# Patient Record
Sex: FEMALE | NOT HISPANIC OR LATINO
[De-identification: names, ages, dates, MRNs, and addresses within clinical notes are randomized per-mention and may not be internally consistent; named-entity substitution may affect disease eponyms.]

---

## 2020-01-23 PROBLEM — Z00.00 ENCOUNTER FOR PREVENTIVE HEALTH EXAMINATION: Status: ACTIVE | Noted: 2020-01-23

## 2020-01-29 ENCOUNTER — APPOINTMENT (OUTPATIENT)
Dept: ORTHOPEDIC SURGERY | Facility: CLINIC | Age: 25
End: 2020-01-29
Payer: COMMERCIAL

## 2020-01-29 VITALS — WEIGHT: 95 LBS | HEIGHT: 61 IN | BODY MASS INDEX: 17.94 KG/M2

## 2020-01-29 DIAGNOSIS — Z78.9 OTHER SPECIFIED HEALTH STATUS: ICD-10-CM

## 2020-01-29 PROCEDURE — 99203 OFFICE O/P NEW LOW 30 MIN: CPT

## 2020-01-29 PROCEDURE — 73564 X-RAY EXAM KNEE 4 OR MORE: CPT | Mod: RT

## 2020-01-29 NOTE — DISCUSSION/SUMMARY
[de-identified] : \par Bilateral knee patellofemoral pain. Given home exercise program prescription for physical therapy. She is requesting MRIs at this time we will attempt authorization she may followup as needed

## 2020-01-29 NOTE — HISTORY OF PRESENT ILLNESS
[de-identified] : 23yo female presents complaining of bilateral anterior knee pain for 3 months. This started when she was vacationing in through using a lot of stairs. She has pain anterior aspect of both knees worse with kneeling, bending. She saw orthopedist and her PCP, was given a Medrol Dosepak as well as anti-inflammatory medication with minimal relief. She states she has been going to physical therapy for 2 months with no relief. She was sent for x-rays at [facility] with no images with her today. Denies numbness tingling, injury\par \par The patient's past medical history, past surgical history, medications, allergies, and social history were reviewed by me today with the patient and documented accordingly. In addition, the patient's family history, which is noncontributory to this visit, was also reviewed.\par

## 2020-01-29 NOTE — PHYSICAL EXAM
[de-identified] : General Exam\par \par Well developed, well nourished\par No apparent distress\par Oriented to person, place, and time\par Mood: Normal\par Affect: Normal\par Balance and coordination: Normal\par Gait: Normal\par \par left knee exam\par \par Skin: Clean, dry, intact\par Inspection: No obvious malalignment, no masses, no swelling, no effusion.\par Tenderness: + MJLT. + LJLT. + tenderness over the medial and lateral patella facets. No ttp medial/lateral epicondyle, patella tendon, tibial tubercle, pes anserinus, or proximal fibula.\par ROM: 0 to 130° no pain with deep flexion in both knees\par Stability: Stable to varus, valgus, lachman testing. Negative anterior/posterior drawer.\par Additional tests: Negative McMurrays test, Negative patellar grind test. \par Strength: 5/5 Q/H/TA/GS/EHL, no atrophy\par Neuro: In tact to light touch throughout in dp/sp/tib/talia/saph nerve districutions, DTR's normal\par Pulses: 2+ DP/PT pulses.\par \par right knee exam\par \par Skin: Clean, dry, intact\par Inspection: No obvious malalignment, no masses, no swelling, no effusion.\par Tenderness: +MJLT. +LJLT. +tenderness over the medial and lateral patella facets. No ttp medial/lateral epicondyle, patella tendon, tibial tubercle, pes anserinus, or proximal fibula.\par ROM: 0 to 130° no pain with deep flexion in both knees\par Stability: Stable to varus, valgus, lachman testing. Negative anterior/posterior drawer.\par Additional tests: Negative McMurrays test, Negative patellar grind test. \par Strength: 5/5 Q/H/TA/GS/EHL, no atrophy\par Neuro: In tact to light touch throughout in dp/sp/tib/talia/saph nerve districutions, DTR's normal\par Pulses: 2+ DP/PT pulses.\par  [de-identified] : \par The following radiographs were ordered and read by me during this patients visit. I reviewed each radiograph in detail with the patient and discussed the findings as highlighted below. \par \par 4 views both knees] were obtained today that show no fracture, dislocation. There is no degenerative change seen. There is no malalignment. No obvious osseous abnormality. Otherwise unremarkable.\par

## 2020-02-03 ENCOUNTER — FORM ENCOUNTER (OUTPATIENT)
Age: 25
End: 2020-02-03

## 2020-02-04 ENCOUNTER — APPOINTMENT (OUTPATIENT)
Dept: MRI IMAGING | Facility: CLINIC | Age: 25
End: 2020-02-04
Payer: COMMERCIAL

## 2020-02-04 ENCOUNTER — OUTPATIENT (OUTPATIENT)
Dept: OUTPATIENT SERVICES | Facility: HOSPITAL | Age: 25
LOS: 1 days | End: 2020-02-04
Payer: COMMERCIAL

## 2020-02-04 DIAGNOSIS — Z00.8 ENCOUNTER FOR OTHER GENERAL EXAMINATION: ICD-10-CM

## 2020-02-04 PROCEDURE — 73721 MRI JNT OF LWR EXTRE W/O DYE: CPT

## 2020-02-04 PROCEDURE — 73721 MRI JNT OF LWR EXTRE W/O DYE: CPT | Mod: 26,LT

## 2020-02-05 ENCOUNTER — NON-APPOINTMENT (OUTPATIENT)
Age: 25
End: 2020-02-05

## 2020-03-24 ENCOUNTER — APPOINTMENT (OUTPATIENT)
Dept: CARDIOLOGY | Facility: CLINIC | Age: 25
End: 2020-03-24
Payer: COMMERCIAL

## 2020-03-24 ENCOUNTER — NON-APPOINTMENT (OUTPATIENT)
Age: 25
End: 2020-03-24

## 2020-03-24 VITALS
WEIGHT: 94 LBS | DIASTOLIC BLOOD PRESSURE: 72 MMHG | HEIGHT: 61 IN | OXYGEN SATURATION: 99 % | SYSTOLIC BLOOD PRESSURE: 103 MMHG | BODY MASS INDEX: 17.75 KG/M2 | HEART RATE: 92 BPM

## 2020-03-24 DIAGNOSIS — R55 SYNCOPE AND COLLAPSE: ICD-10-CM

## 2020-03-24 DIAGNOSIS — I95.89 OTHER HYPOTENSION: ICD-10-CM

## 2020-03-24 DIAGNOSIS — R09.89 OTHER SPECIFIED SYMPTOMS AND SIGNS INVOLVING THE CIRCULATORY AND RESPIRATORY SYSTEMS: ICD-10-CM

## 2020-03-24 DIAGNOSIS — R07.9 CHEST PAIN, UNSPECIFIED: ICD-10-CM

## 2020-03-24 DIAGNOSIS — E86.1 OTHER HYPOTENSION: ICD-10-CM

## 2020-03-24 PROCEDURE — 99204 OFFICE O/P NEW MOD 45 MIN: CPT

## 2020-03-24 PROCEDURE — 93268 ECG RECORD/REVIEW: CPT

## 2020-03-24 PROCEDURE — 93000 ELECTROCARDIOGRAM COMPLETE: CPT

## 2020-03-24 RX ORDER — MV-MIN/FOLIC/VIT K/LYCOP/COQ10 200-100MCG
CAPSULE ORAL
Refills: 0 | Status: ACTIVE | COMMUNITY

## 2020-03-24 RX ORDER — ADHESIVE TAPE 3"X 2.3 YD
50 MCG TAPE, NON-MEDICATED TOPICAL
Refills: 0 | Status: ACTIVE | COMMUNITY

## 2020-03-24 RX ORDER — DESOGESTREL AND ETHINYL ESTRADIOL 0.15-0.03
0.15-3 KIT ORAL
Refills: 0 | Status: ACTIVE | COMMUNITY

## 2020-03-26 NOTE — DISCUSSION/SUMMARY
[FreeTextEntry1] : This is a 25-year-old female without any risk factors who has a normal exam and ECG. She runs a very low normal blood pressure and is at risk for orthostatic hypotension. Today she did not exhibit any orthostatic change.\par \par Her episodes in the morning of lightheadedness are probably due to orthostatic hypotension and her episodes of syncope most likely are due to hypotension either from orthostatic change or due to vagal event. She's had 2 episodes that sound vagal. The episode in the shower and the episode that occurred after urination.\par \par We discussed the importance of lifestyle including keeping well hydrated, and adding extra salt to her diet, and getting up slowly. If she does get any episodes where she feels she might pass out she needs to get her head down to prevent syncope..\par \par We will do an event monitor to see if she's having any episodes of arrhythmia or tachycardia to rule out a POTS syndrome. She also will eventually do a 24-hour blood pressure monitor when she is well hydrated and taking extra salt. She will schedule an echocardiogram make sure the heart is structurally normal.\par \par The episodes of chest pain occurring at night seem to be related to body position sound like reflux disease. She does not eat after dinner. She might try taking Pepcid or omeprazole prophylactically to see if this helps. Otherwise may need GI evaluation. The episodes do not occur during the day and unlikely would be cardiac in origin. She is concerned about possible plaque and she does have an LDL of 121 and high triglycerides. We'll do a carotid Doppler as a screening test\par \par Would suggest neurological evaluation.\par \par She is going for general blood work to include a CMP, CBC, cholesterol, and thyroid. I would like to review these tests when available.\par \par This was discussed in detail with the patient and her parents and I answered all of their questions. Further recommendations are made pending the above. If the patient does have further events she will call me.

## 2020-03-26 NOTE — PHYSICAL EXAM
[General Appearance - Well Developed] : well developed [Normal Appearance] : normal appearance [Well Groomed] : well groomed [General Appearance - Well Nourished] : well nourished [No Deformities] : no deformities [General Appearance - In No Acute Distress] : no acute distress [Normal Conjunctiva] : the conjunctiva exhibited no abnormalities [Normal Oral Mucosa] : normal oral mucosa [Normal Jugular Venous A Waves Present] : normal jugular venous A waves present [Normal Jugular Venous V Waves Present] : normal jugular venous V waves present [No Jugular Venous Key A Waves] : no jugular venous key A waves [Normal Rate] : normal [Normal S1] : normal S1 [Normal S2] : normal S2 [No Murmur] : no murmurs heard [2+] : left 2+ [No Abnormalities] : the abdominal aorta was not enlarged and no bruit was heard [No Pitting Edema] : no pitting edema present [Respiration, Rhythm And Depth] : normal respiratory rhythm and effort [Exaggerated Use Of Accessory Muscles For Inspiration] : no accessory muscle use [Auscultation Breath Sounds / Voice Sounds] : lungs were clear to auscultation bilaterally [Bowel Sounds] : normal bowel sounds [Abdomen Soft] : soft [Abdomen Tenderness] : non-tender [Abnormal Walk] : normal gait [Gait - Sufficient For Exercise Testing] : the gait was sufficient for exercise testing [Nail Clubbing] : no clubbing of the fingernails [Cyanosis, Localized] : no localized cyanosis [Skin Color & Pigmentation] : normal skin color and pigmentation [Skin Turgor] : normal skin turgor [] : no rash [Oriented To Time, Place, And Person] : oriented to person, place, and time [Impaired Insight] : insight and judgment were intact [No Anxiety] : not feeling anxious [S3] : no S3 [S4] : no S4 [Right Carotid Bruit] : no bruit heard over the right carotid [Left Carotid Bruit] : no bruit heard over the left carotid [Right Femoral Bruit] : no bruit heard over the right femoral artery [Left Femoral Bruit] : no bruit heard over the left femoral artery

## 2020-03-26 NOTE — REVIEW OF SYSTEMS
[Chest Pain] : chest pain [Dizziness] : dizziness [Negative] : Musculoskeletal [Fever] : no fever [Headache] : no headache [Recent Weight Gain (___ Lbs)] : no recent weight gain [Chills] : no chills [Feeling Fatigued] : not feeling fatigued [Recent Weight Loss (___ Lbs)] : no recent weight loss [Blurry Vision] : no blurred vision [Seeing Double (Diplopia)] : no diplopia [Shortness Of Breath] : no shortness of breath [Palpitations] : no palpitations [Skin: A Rash] : no rash: [Depression] : no depression [Anxiety] : no anxiety [Excessive Thirst] : no polydipsia [Easy Bleeding] : no tendency for easy bleeding [Easy Bruising] : no tendency for easy bruising

## 2020-03-26 NOTE — HISTORY OF PRESENT ILLNESS
[FreeTextEntry1] : I saw Vandana Cook in the office today for cardiac evaluation. She is a 24-year-old  female who is been in good general health. She has graduated from college and is now applying for medical school and stenting for the [employer]. She had injury while hiking in August of 2019. She hurt her knee and has been getting physical therapy. Is not in any significant exercise since that time.\par \par She had her first episode of pressure in her chest in August. These episodes wake her from sleep. She feels a pressure in the center of her chest that seems to respond to change in body position. These episodes do not occur daily day. They're random and occur sometime several times a week or not for several weeks. Has not changed over time.\par \par In addition, the patient has had several syncopal events. The first one occurred in September when she was seen in voiding. She went down a steep hill and felt very dizzy and passed out. She's had several additional episodes. The most recent ones occurred while she was showering. She was bending down and got up and felt lightheaded and passed out. She had another episode of being dizzy when she was urinating. She has noted for long time when she first gets up out of bed she feels lightheaded for a few seconds and then does better. She tends to run a low blood pressure and has felt that she has orthostatic hypotension.\par \par She denies any history of diabetes, smoking, hypertension, family history of heart disease. She does run a triglycerides in the 200s. He is on birth control pills for several years to regulate her periods. Her blood work in November which was all normal. The only abnormality was a high triglycerides.

## 2020-04-22 DIAGNOSIS — K21.9 GASTRO-ESOPHAGEAL REFLUX DISEASE W/OUT ESOPHAGITIS: ICD-10-CM

## 2020-05-15 ENCOUNTER — RX CHANGE (OUTPATIENT)
Age: 25
End: 2020-05-15

## 2020-05-15 RX ORDER — OMEPRAZOLE 40 MG/1
40 CAPSULE, DELAYED RELEASE ORAL
Qty: 30 | Refills: 3 | Status: ACTIVE | COMMUNITY
Start: 1900-01-01 | End: 1900-01-01

## 2020-06-16 ENCOUNTER — APPOINTMENT (OUTPATIENT)
Dept: ORTHOPEDIC SURGERY | Facility: CLINIC | Age: 25
End: 2020-06-16
Payer: COMMERCIAL

## 2020-06-16 DIAGNOSIS — M22.42 CHONDROMALACIA PATELLAE, LEFT KNEE: ICD-10-CM

## 2020-06-16 DIAGNOSIS — M22.41 CHONDROMALACIA PATELLAE, RIGHT KNEE: ICD-10-CM

## 2020-06-16 PROCEDURE — 99214 OFFICE O/P EST MOD 30 MIN: CPT

## 2020-06-29 ENCOUNTER — RESULT REVIEW (OUTPATIENT)
Age: 25
End: 2020-06-29

## 2020-06-29 ENCOUNTER — APPOINTMENT (OUTPATIENT)
Dept: ULTRASOUND IMAGING | Facility: CLINIC | Age: 25
End: 2020-06-29
Payer: COMMERCIAL

## 2020-06-29 ENCOUNTER — OUTPATIENT (OUTPATIENT)
Dept: OUTPATIENT SERVICES | Facility: HOSPITAL | Age: 25
LOS: 1 days | End: 2020-06-29
Payer: COMMERCIAL

## 2020-06-29 DIAGNOSIS — Z00.8 ENCOUNTER FOR OTHER GENERAL EXAMINATION: ICD-10-CM

## 2020-06-29 PROCEDURE — 76881 US COMPL JOINT R-T W/IMG: CPT

## 2020-06-29 PROCEDURE — 76881 US COMPL JOINT R-T W/IMG: CPT | Mod: 26,RT

## 2020-06-29 NOTE — PHYSICAL EXAM
[de-identified] : left knee exam\par \par Skin: Clean, dry, intact\par Inspection: No obvious malalignment, no masses, no swelling, no effusion.\par Tenderness: + MJLT. + LJLT. + tenderness over the medial and lateral patella facets. No ttp medial/lateral epicondyle, patella tendon, tibial tubercle, pes anserinus, or proximal fibula.\par ROM: 0 to 130° no pain with deep flexion in both knees\par Stability: Stable to varus, valgus, lachman testing. Negative anterior/posterior drawer.\par Additional tests: Negative McMurrays test, Negative patellar grind test. \par Strength: 5/5 Q/H/TA/GS/EHL, no atrophy\par Neuro: In tact to light touch throughout in dp/sp/tib/talia/saph nerve districutions, DTR's normal\par Pulses: 2+ DP/PT pulses.\par \par right knee exam\par \par Skin: Clean, dry, intact\par Inspection: No obvious malalignment, no masses, no swelling, no effusion.\par Tenderness: +MJLT. +LJLT. +tenderness over the medial and lateral patella facets. No ttp medial/lateral epicondyle, patella tendon, tibial tubercle, pes anserinus, or proximal fibula.\par ROM: 0 to 130° no pain with deep flexion in both knees\par Stability: Stable to varus, valgus, lachman testing. Negative anterior/posterior drawer.\par Additional tests: Negative McMurrays test, Negative patellar grind test. \par Strength: 5/5 Q/H/TA/GS/EHL, no atrophy\par Neuro: In tact to light touch throughout in dp/sp/tib/talia/saph nerve districutions, DTR's normal\par Pulses: 2+ DP/PT pulses. [de-identified] : MRI of both knees was reviewed. There is trace edema laterally mild iliotibial band friction syndrome there is no mass there is no swelling cartilage meniscus intact bilaterally

## 2020-06-29 NOTE — DISCUSSION/SUMMARY
[de-identified] : \par \par 24-year-old female with bilateral knee patellofemoral pain. I do not appreciate a swelling difference between her knees and she is insisting is there. Her knees are structurally normal MRI with only mild iliotibial band friction syndrome. Again recommend physical therapy and anti-inflammatory medications. As she is insisting as a structural issue we will get a dynamic ultrasound to look for structural differences between the 2 knees is accounting for the swelling she is described

## 2020-06-29 NOTE — HISTORY OF PRESENT ILLNESS
[de-identified] : 25yo female presents complaining of bilateral anterior knee pain for >3 months. She complains of swelling behind her knee's. She believes she has baker's cyst behind her knee's. She states the pain on the anterior portion of her knee has diminished. She complains of intermittent stiffness, swelling in her knees. Denies NT.\par She c/o asymetry between her knees.  no n/t

## 2020-09-02 ENCOUNTER — APPOINTMENT (OUTPATIENT)
Dept: OBGYN | Facility: CLINIC | Age: 25
End: 2020-09-02

## 2020-09-14 ENCOUNTER — APPOINTMENT (OUTPATIENT)
Dept: ULTRASOUND IMAGING | Facility: CLINIC | Age: 25
End: 2020-09-14